# Patient Record
Sex: FEMALE | Race: WHITE | NOT HISPANIC OR LATINO | URBAN - METROPOLITAN AREA
[De-identification: names, ages, dates, MRNs, and addresses within clinical notes are randomized per-mention and may not be internally consistent; named-entity substitution may affect disease eponyms.]

---

## 2017-10-04 ENCOUNTER — OUTPATIENT (OUTPATIENT)
Dept: OUTPATIENT SERVICES | Facility: HOSPITAL | Age: 42
LOS: 1 days | End: 2017-10-04

## 2017-10-04 VITALS
RESPIRATION RATE: 16 BRPM | HEIGHT: 65 IN | TEMPERATURE: 99 F | SYSTOLIC BLOOD PRESSURE: 108 MMHG | WEIGHT: 190.92 LBS | HEART RATE: 68 BPM | DIASTOLIC BLOOD PRESSURE: 74 MMHG

## 2017-10-04 DIAGNOSIS — Z01.818 ENCOUNTER FOR OTHER PREPROCEDURAL EXAMINATION: ICD-10-CM

## 2017-10-04 DIAGNOSIS — O02.1 MISSED ABORTION: ICD-10-CM

## 2017-10-04 DIAGNOSIS — Z98.890 OTHER SPECIFIED POSTPROCEDURAL STATES: Chronic | ICD-10-CM

## 2017-10-04 LAB
BLD GP AB SCN SERPL QL: NEGATIVE — SIGNIFICANT CHANGE UP
HCT VFR BLD CALC: 39.9 % — SIGNIFICANT CHANGE UP (ref 34.5–45)
HGB BLD-MCNC: 13.3 G/DL — SIGNIFICANT CHANGE UP (ref 11.5–15.5)
MCHC RBC-ENTMCNC: 31.2 PG — SIGNIFICANT CHANGE UP (ref 27–34)
MCHC RBC-ENTMCNC: 33.3 % — SIGNIFICANT CHANGE UP (ref 32–36)
MCV RBC AUTO: 93.7 FL — SIGNIFICANT CHANGE UP (ref 80–100)
NRBC # FLD: 0 — SIGNIFICANT CHANGE UP
PLATELET # BLD AUTO: 198 K/UL — SIGNIFICANT CHANGE UP (ref 150–400)
PMV BLD: 11.5 FL — SIGNIFICANT CHANGE UP (ref 7–13)
RBC # BLD: 4.26 M/UL — SIGNIFICANT CHANGE UP (ref 3.8–5.2)
RBC # FLD: 13 % — SIGNIFICANT CHANGE UP (ref 10.3–14.5)
RH IG SCN BLD-IMP: POSITIVE — SIGNIFICANT CHANGE UP
WBC # BLD: 5.32 K/UL — SIGNIFICANT CHANGE UP (ref 3.8–10.5)
WBC # FLD AUTO: 5.32 K/UL — SIGNIFICANT CHANGE UP (ref 3.8–10.5)

## 2017-10-04 NOTE — H&P PST ADULT - GASTROINTESTINAL DETAILS
bowel sounds normal/no rebound tenderness/no rigidity/no organomegaly/no distention/no guarding/soft/no bruit/no masses palpable/nontender

## 2017-10-04 NOTE — H&P PST ADULT - HISTORY OF PRESENT ILLNESS
41 yr old  at approx 10 weeks gestation presents for preop evaluation with Missed .  Pt is now scheduled for Dilation Vacuum Curettage on 10/10/17.

## 2017-10-04 NOTE — H&P PST ADULT - RS GEN PE MLT RESP DETAILS PC
no wheezes/no rales/respirations non-labored/no rhonchi/breath sounds equal/clear to auscultation bilaterally

## 2017-10-04 NOTE — H&P PST ADULT - PRO PAIN LIFE ADAPT
decreased appetite/inability to work/inability to concentrate/inability to sleep/decreased activity level

## 2017-10-04 NOTE — H&P PST ADULT - LYMPHATIC
supraclavicular L/anterior cervical R/supraclavicular R/posterior cervical L/posterior cervical R/anterior cervical L

## 2017-10-04 NOTE — H&P PST ADULT - PROBLEM SELECTOR PLAN 1
Scheduled for Dilation Vacuum Curettage on 10/10/17.  Lab results pending.  Preop and famotidine instructions provided and questions addressed.

## 2017-10-09 ENCOUNTER — TRANSCRIPTION ENCOUNTER (OUTPATIENT)
Age: 42
End: 2017-10-09

## 2017-10-09 RX ORDER — SODIUM CHLORIDE 9 MG/ML
1000 INJECTION, SOLUTION INTRAVENOUS
Qty: 0 | Refills: 0 | Status: DISCONTINUED | OUTPATIENT
Start: 2017-10-10 | End: 2017-10-11

## 2017-10-10 ENCOUNTER — OUTPATIENT (OUTPATIENT)
Dept: OUTPATIENT SERVICES | Facility: HOSPITAL | Age: 42
LOS: 1 days | Discharge: HOME | End: 2017-10-10
Payer: MEDICAID

## 2017-10-10 ENCOUNTER — RESULT REVIEW (OUTPATIENT)
Age: 42
End: 2017-10-10

## 2017-10-10 VITALS
HEIGHT: 65 IN | WEIGHT: 190.92 LBS | DIASTOLIC BLOOD PRESSURE: 86 MMHG | SYSTOLIC BLOOD PRESSURE: 123 MMHG | TEMPERATURE: 98 F | OXYGEN SATURATION: 98 % | RESPIRATION RATE: 16 BRPM | HEART RATE: 74 BPM

## 2017-10-10 VITALS
TEMPERATURE: 98 F | DIASTOLIC BLOOD PRESSURE: 64 MMHG | OXYGEN SATURATION: 98 % | RESPIRATION RATE: 16 BRPM | HEART RATE: 75 BPM | SYSTOLIC BLOOD PRESSURE: 105 MMHG

## 2017-10-10 DIAGNOSIS — O02.1 MISSED ABORTION: ICD-10-CM

## 2017-10-10 DIAGNOSIS — Z98.890 OTHER SPECIFIED POSTPROCEDURAL STATES: Chronic | ICD-10-CM

## 2017-10-10 PROCEDURE — 88305 TISSUE EXAM BY PATHOLOGIST: CPT | Mod: 26

## 2017-10-10 RX ORDER — ACETAMINOPHEN 500 MG
2 TABLET ORAL
Qty: 0 | Refills: 0 | COMMUNITY

## 2017-10-10 RX ORDER — ASPIRIN/CALCIUM CARB/MAGNESIUM 324 MG
1 TABLET ORAL
Qty: 0 | Refills: 0 | COMMUNITY

## 2017-10-10 RX ORDER — TRANEXAMIC ACID 100 MG/ML
1 INJECTION, SOLUTION INTRAVENOUS
Qty: 6 | Refills: 0 | OUTPATIENT
Start: 2017-10-10 | End: 2017-10-13

## 2017-10-10 RX ADMIN — SODIUM CHLORIDE 30 MILLILITER(S): 9 INJECTION, SOLUTION INTRAVENOUS at 16:49

## 2017-10-10 NOTE — ASU DISCHARGE PLAN (ADULT/PEDIATRIC). - COMMENTS
Surgical Unit will call you on the next business day to follow up. [facility] [place] is open Monday - Friday.

## 2017-10-10 NOTE — ASU DISCHARGE PLAN (ADULT/PEDIATRIC). - NOTIFY
Bleeding that does not stop/Persistent Nausea and Vomiting/Unable to Urinate/GYN Fever>100.4/Numbness, tingling/Excessive Diarrhea GYN Fever>100.4/Numbness, tingling/Pain not relieved by Medications/Unable to Urinate/Inability to Tolerate Liquids or Foods/Bleeding that does not stop/Persistent Nausea and Vomiting/Excessive Diarrhea

## 2017-10-10 NOTE — ASU DISCHARGE PLAN (ADULT/PEDIATRIC). - ACTIVITY LEVEL
no douching/no tampons/no intercourse/no tub baths/nothing per vagina for 2 weeks/nothing per vagina nothing per vagina/no tub baths/no tampons/no intercourse/nothing per vagina for 2 weeks/no heavy lifting/no sports/gym/no douching/no exercise

## 2017-10-14 LAB — SURGICAL PATHOLOGY STUDY: SIGNIFICANT CHANGE UP

## 2018-04-11 NOTE — ASU PREOP CHECKLIST - 1.
After Visit Summary   4/11/2018    Mary Lerma    MRN: [id number]           Patient Information     Date Of Birth          1988        Visit Information        Provider Department      4/11/2018 8:30 AM Elena Mcdonough NP [facility]        Today's Diagnoses     Overweight    -  1    Fibromyalgia           Follow-ups after your visit        Additional Services     BARIATRIC ADULT REFERRAL       Your provider has referred you to: [facility]: [facility] [phone]  [email or web address]    Please be aware that coverage of these services is subject to the terms and limitations of your health insurance plan.  Call member services at your health plan with any benefit or coverage questions.      Please bring the following with you to your appointment:      (1) List of current medications   (2) This referral request   (3) Any documents/labs given to you for this referral                  Follow-up notes from your care team     Return in about 6 months (around 10/11/2018) for depression/anxiety recheck.      Who to contact     If you have questions or need follow up information about today's clinic visit or your schedule please contact [facility] directly at [phone].  Normal or non-critical lab and imaging results will be communicated to you by MyChart, letter or phone within 4 business days after the clinic has received the results. If you do not hear from us within 7 days, please contact the clinic through MyChart or phone. If you have a critical or abnormal lab result, we will notify you by phone as soon as possible.  Submit refill requests through [employer] or call your pharmacy and they will forward the refill request to us. Please allow 3 business days for your refill to be completed.          Additional Information About Your Visit       "  MyChart Information     [employer] gives you secure access to your electronic health record. If you see a primary care provider, you can also send messages to your care team and make appointments. If you have questions, please call your primary care clinic.  If you do not have a primary care provider, please call [phone] and they will assist you.        Care EveryWhere ID     This is your Care EveryWhere ID. This could be used by other organizations to access your [place] medical records  [id number]        Your Vitals Were     Pulse Temperature Respirations Height Pulse Oximetry Breastfeeding?    77 99.1  F (37.3  C) (Oral) 16 5' 2\" (1.575 m) 98% No    BMI (Body Mass Index)                   36.4 kg/m2            Blood Pressure from Last 3 Encounters:   04/11/18 100/62   02/19/18 118/71   02/06/18 112/71    Weight from Last 3 Encounters:   04/11/18 199 lb (90.3 kg)   02/19/18 188 lb (85.3 kg)   02/06/18 188 lb (85.3 kg)              We Performed the Following     BARIATRIC ADULT REFERRAL          Where to get your medicines      These medications were sent to [facility] [address] [place] [address] [id number] [place] [address] AT [address]  [address] [facility] [address]     Phone:  [phone]     DULoxetine 60 MG EC capsule          Primary Care Provider Office Phone # Fax #    Elena Mcdonough [phone]       [facility] [address] [address]  [facility] [address]        Equal Access to Services     [facility] : Hadii aad ku hadasho Soomaali, waaxda luqadaha, qaybta kaalmada ademya, héctor steward . So [facility] [phone].    ATENCIÓN: Si habla español, tiene a myers disposición servicios gratuitos de asistencia lingüística. Llame al [phone].    We comply with applicable federal civil rights laws and [place] laws. We do not discriminate on the basis of race, color, national origin, age, disability, sex, sexual orientation, or gender " identity.            Thank you!     Thank you for choosing [facility]  for your care. Our goal is always to provide you with excellent care. Hearing back from our patients is one way we can continue to improve our services. Please take a few minutes to complete the written survey that you may receive in the mail after your visit with us. Thank you!             Your Updated Medication List - Protect others around you: Learn how to safely use, store and throw away your medicines at www.disposemymeds.org.          This list is accurate as of 4/11/18  8:58 AM.  Always use your most recent med list.                   Brand Name Dispense Instructions for use Diagnosis    DULoxetine 60 MG EC capsule    CYMBALTA    90 capsule    Take 1 capsule (60 mg) by mouth daily    Fibromyalgia       medroxyPROGESTERone 150 MG/ML injection    DEPO-PROVERA    1 mL    Inject 1 mL (150 mg) into the muscle every 3 months    Encounter for initial prescription of injectable contraceptive       methocarbamol 500 MG tablet    ROBAXIN    75 tablet    Take 1-2 tablets (500-1,000 mg) by mouth 3 times daily as needed for muscle spasms    Muscle spasm       SUMAtriptan 100 MG tablet    IMITREX    18 tablet    Take 1 tablet (100 mg) by mouth at onset of headache for migraine May repeat in 2 hours. Max 2 tablets/24 hours.    Other migraine without status migrainosus, not intractable          Completed GYN questionnaire on chart

## 2020-09-24 NOTE — H&P PST ADULT - HEMATOLOGY/LYMPHATICS
Eduardo Hurtado  [address] [id number]  Phone: [phone]  Fax: [phone]  Follow Up Time:    negative

## 2021-07-06 PROBLEM — Z64.1 PROBLEMS RELATED TO MULTIPARITY: Chronic | Status: ACTIVE | Noted: 2017-10-04

## 2021-07-06 PROBLEM — O02.1 MISSED ABORTION: Chronic | Status: ACTIVE | Noted: 2017-10-04

## 2021-08-19 PROBLEM — Z00.00 ENCOUNTER FOR PREVENTIVE HEALTH EXAMINATION: Status: ACTIVE | Noted: 2021-08-19

## 2021-08-30 ENCOUNTER — TRANSCRIPTION ENCOUNTER (OUTPATIENT)
Age: 46
End: 2021-08-30

## 2021-08-30 ENCOUNTER — APPOINTMENT (OUTPATIENT)
Dept: OTOLARYNGOLOGY | Facility: CLINIC | Age: 46
End: 2021-08-30
Payer: MEDICAID

## 2021-08-30 VITALS — HEIGHT: 65 IN | TEMPERATURE: 97.4 F | WEIGHT: 171 LBS | BODY MASS INDEX: 28.49 KG/M2

## 2021-08-30 DIAGNOSIS — M26.609 UNSPECIFIED TEMPOROMANDIBULAR JOINT DISORDER: ICD-10-CM

## 2021-08-30 DIAGNOSIS — K21.9 GASTRO-ESOPHAGEAL REFLUX DISEASE W/OUT ESOPHAGITIS: ICD-10-CM

## 2021-08-30 DIAGNOSIS — J31.0 CHRONIC RHINITIS: ICD-10-CM

## 2021-08-30 DIAGNOSIS — R51.9 HEADACHE, UNSPECIFIED: ICD-10-CM

## 2021-08-30 PROCEDURE — 99204 OFFICE O/P NEW MOD 45 MIN: CPT | Mod: 25

## 2021-08-30 PROCEDURE — 31231 NASAL ENDOSCOPY DX: CPT

## 2021-08-30 RX ORDER — LEVONORGESTREL AND ETHINYL ESTRADIOL AND ETHINYL ESTRADIOL 150-30(84)
0.15-0.03 &0.01 KIT ORAL
Qty: 91 | Refills: 0 | Status: ACTIVE | COMMUNITY
Start: 2021-08-15

## 2021-08-30 NOTE — PHYSICAL EXAM
[Midline] : trachea located in midline position [Normal] : no rashes [Nasal Endoscopy Performed] : nasal endoscopy was performed, see procedure section for findings [] : septum deviated to the right [de-identified] : tenderness [Laryngoscopy Performed] : laryngoscopy was performed, see procedure section for findings [de-identified] : post pharyngeal erythema

## 2021-08-30 NOTE — END OF VISIT
[FreeTextEntry3] : I personally saw and examined CALEB OBANDO in detail. I spoke to JULIANNE Harman regarding the assessment and plan of care. I reviewed the above assessment and plan of care, and agree. I have made changes in changes in the body of the note where appropriate.I personally reviewed the HPI, PMH, FH, SH, ROS and medications/allergies. I have spoken to JULIANNE Harman regarding the history and have personally determined the assessment and plan of care, and documented this myself. I was present and participated in all key portions of the encounter and all procedures noted above. I have made changes in the body of the note where appropriate.\par \par Attesting Faculty: See Attending Signature Below \par \par \par  [Time Spent: ___ minutes] : I have spent [unfilled] minutes of time on the encounter.

## 2021-08-30 NOTE — PROCEDURE
[FreeTextEntry6] : Anterior Rhinoscopy insufficient to account for symptoms.\par \par After informed verbal consent is obtained, the fiberoptic nasal endoscope #23 is passed via the right nasal cavity.\par The following anatomic sites were directly examined in a sequential fashion:\par The scope was introduced in both  nasal passages between the middle and inferior turbinates to exam the inferior portion of the middle meatus and the fontanelle, as well as the maxillary ostia.  Next, the scope was passed medially and posteriorly to the middle turbinates to examine the sphenoethmoid recess and the superior turbinate region.\par  \par \par   There is [ 0 ]% obstruction of the nasopharynx with adenoid tissue.\par \par A deviated nasal septum was noted causing obstruction.\par The turbinates were congested-bilateral.\par \par Right Side:\par ·               Mucosa: wnl\par ·               Mucous: none\par ·               Polyp: none\par ·               Inferior Turbinate: sl congested\par ·               Middle Turbinate:sl congested\par ·               Superior Turbinate: wnl\par ·               Inferior Meatus:clear\par ·               Middle Meatus: clear\par ·               Super Meatus: clear\par ·               Sphenoethmoidal Recess: wnl\par \par \par \par Left Side:\par ·               Mucosa: wnl\par ·               Mucous:none\par ·               Polyp: none\par ·               Inferior Turbinate: sl congested\par ·               Middle Turbinate: sl congested\par ·               Superior Turbinate:wnl\par ·               Inferior Meatus: clear\par ·               Middle Meatus: clear\par ·               Super Meatus:clear\par ·               Sphenoethmoidal Recess: wnl\par \par  [de-identified] : Reason for the procedure-throat symptoms not adequately evaluated by mirror exam\par After informed verbal consent is obtained. \par The fiberoptic laryngoscope #  is passed via the  nasal cavity. There is no adenoid hypertrophy of the nasopharynx.  \par \par Tongue Base-wnl\par  Larynx-wnl\par Hypopharynx-wnl\par  tongue base, \par vallecular-wnl\par epiglottis-wnl\par subglottis-wnl\par posterior pharyngeal wall-wnl\par \par true vocal cords-small nodules bilat\par arytenoids-erythema and edema\par false vocal cords-wnl\par ventricles-wnl \par pyriform sinus-wnl no pooling\par aryepiglottic folds-wnl\par \par

## 2021-08-30 NOTE — REASON FOR VISIT
[Initial Evaluation] : an initial evaluation for [Ear Pain] : ear pain [Nasal Obstruction] : nasal obstruction [Hoarseness/Dysphonia] : hoarseness [Gastroesophageal Reflux] : gastroesophageal reflux

## 2021-08-30 NOTE — ASSESSMENT
[FreeTextEntry1] : Patient complains of sinus pressure and headache x many years and hoarseness \par \par Sinus headache and Pressure:\par -CT Sinus ordered \par -Possible Balloon sinuplasty vs septoplasty \par -Hypertonic saline advised \par \par Hoarseness 2/2 GERD and VC nodules\par -voice rest advised \par -follow up voice therapist \par \par TMJ:\par -soft food diet \par -dental evaluation \par -Advil for pain \par -warm and cold compresses\par -CBD oil advised \par \par f/u post CT scan or prn

## 2021-08-30 NOTE — HISTORY OF PRESENT ILLNESS
[de-identified] : 44 yo female\par Patient complains of sinus headache, nasal drip and hoarse x many years. She gets frontal and maxillary sinus pain and pressure. Takes Tylenol and Advil with mild relief. She was scheduled for sinus surgery years ago but never got it done. 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 She has been complaining of headache for many years with mild relief with Tylenol and Advil. She has tried nasal sprays in the past but it didn’t help her. Pt has no ear pain, ear drainage, hearing loss, tinnitus, vertigo, epistaxis, throat pain, dysphagia or fevers\par \par  [Otalgia] : otalgia [Eustachian Tube Dysfunction] : eustachian tube dysfunction [Cholesteatoma] : no cholesteatoma [Early Onset Hearing Loss] : no early onset hearing loss [Stroke] : no stroke [Nasal Congestion] : nasal congestion [Ear Fullness] : ear fullness [Allergic Rhinitis] : no allergic rhinitis [Septal Deviation] : septal deviation [Adenoidectomy] : no adenoidectomy [Allergies] : no allergies [Asthma] : no asthma [Hyperthyroidism] : no hyperthyroidism [Sialadenitis] : no sialadenitis [Non-Hodgkin Lymphoma] : no non-hodgkin lymphoma [Hodgkin Disease] : no hodgkin disease [None] : No risk factors have been identified. [Graves Disease] : no graves disease [Thyroid Cancer] : no thyroid cancer

## 2021-10-04 ENCOUNTER — OUTPATIENT (OUTPATIENT)
Dept: OUTPATIENT SERVICES | Facility: HOSPITAL | Age: 46
LOS: 1 days | End: 2021-10-04
Payer: MEDICAID

## 2021-10-04 ENCOUNTER — APPOINTMENT (OUTPATIENT)
Dept: CT IMAGING | Facility: IMAGING CENTER | Age: 46
End: 2021-10-04
Payer: MEDICAID

## 2021-10-04 DIAGNOSIS — J31.0 CHRONIC RHINITIS: ICD-10-CM

## 2021-10-04 DIAGNOSIS — J34.2 DEVIATED NASAL SEPTUM: ICD-10-CM

## 2021-10-04 DIAGNOSIS — Z98.890 OTHER SPECIFIED POSTPROCEDURAL STATES: Chronic | ICD-10-CM

## 2021-10-04 PROCEDURE — 70486 CT MAXILLOFACIAL W/O DYE: CPT | Mod: 26

## 2021-10-04 PROCEDURE — 70486 CT MAXILLOFACIAL W/O DYE: CPT

## 2021-11-01 ENCOUNTER — APPOINTMENT (OUTPATIENT)
Dept: OTOLARYNGOLOGY | Facility: CLINIC | Age: 46
End: 2021-11-01
Payer: MEDICAID

## 2021-11-01 VITALS
WEIGHT: 175 LBS | BODY MASS INDEX: 29.16 KG/M2 | HEIGHT: 65 IN | DIASTOLIC BLOOD PRESSURE: 81 MMHG | TEMPERATURE: 97 F | HEART RATE: 69 BPM | SYSTOLIC BLOOD PRESSURE: 113 MMHG

## 2021-11-01 DIAGNOSIS — J38.2 NODULES OF VOCAL CORDS: ICD-10-CM

## 2021-11-01 DIAGNOSIS — R49.0 DYSPHONIA: ICD-10-CM

## 2021-11-01 DIAGNOSIS — J34.2 DEVIATED NASAL SEPTUM: ICD-10-CM

## 2021-11-01 PROCEDURE — 99214 OFFICE O/P EST MOD 30 MIN: CPT | Mod: 25

## 2021-11-01 PROCEDURE — 95004 PERQ TESTS W/ALRGNC XTRCS: CPT

## 2021-11-01 PROCEDURE — 31231 NASAL ENDOSCOPY DX: CPT

## 2021-11-01 NOTE — REVIEW OF SYSTEMS
[Nasal Congestion] : nasal congestion [Sinus Pressure] : sinus pressure [Hoarseness] : hoarseness [Negative] : Heme/Lymph

## 2021-11-01 NOTE — END OF VISIT
[FreeTextEntry3] : I saw and examined this patient in person. I have discussed with Michelle Souza, Physician Assistant, in detail the above note and agree with the above assessment and plan of care.\par

## 2021-11-01 NOTE — ASSESSMENT
[FreeTextEntry1] : Patient long history of headaches been told has a deviated nasal septum had a CAT scan done which was reviewed with her shows significant deviated septum with a spur in the right nasal cavity her main complaints are difficulty breathing through her nose as well as recurrent pressure headaches that seem to be triggered into migraines.  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  We did allergy testing on her she was allergic to Mat which is a grass as well as dust mites and she was notified of that.  She has to check her insurance moving forward and wants to proceed in the near future.  Risks and benefits of the surgery were discussed at length.

## 2021-11-01 NOTE — HISTORY OF PRESENT ILLNESS
[de-identified] : Patient has had many years of headaches and nasal congestion with runny nose. SHe had a CT ordered by Dr Polo that shows clear sinuses but a DNS. SHe admits that she has never been able to breathe well through her nose and was due to have surgery to fix her septum but never did. SHe gets headaches that start in her nose and than radiate to her temples. SHe feels that the inside of her nose gets swollen.\par SHe has a raspy voice for many years intermittently and recently she has had worsening. 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 She has been told that she has nodules on the vocal cords. SHe does not have any issues with eating drinking or swallowing.

## 2021-12-07 ENCOUNTER — OUTPATIENT (OUTPATIENT)
Dept: OUTPATIENT SERVICES | Facility: HOSPITAL | Age: 46
LOS: 1 days | End: 2021-12-07

## 2021-12-07 VITALS
HEART RATE: 78 BPM | DIASTOLIC BLOOD PRESSURE: 71 MMHG | OXYGEN SATURATION: 98 % | HEIGHT: 64 IN | SYSTOLIC BLOOD PRESSURE: 109 MMHG | RESPIRATION RATE: 16 BRPM | TEMPERATURE: 97 F | WEIGHT: 175.05 LBS

## 2021-12-07 DIAGNOSIS — Z98.890 OTHER SPECIFIED POSTPROCEDURAL STATES: Chronic | ICD-10-CM

## 2021-12-07 DIAGNOSIS — J34.2 DEVIATED NASAL SEPTUM: ICD-10-CM

## 2021-12-07 LAB
ALBUMIN SERPL ELPH-MCNC: 4 G/DL — SIGNIFICANT CHANGE UP (ref 3.3–5)
ALP SERPL-CCNC: 42 U/L — SIGNIFICANT CHANGE UP (ref 40–120)
ALT FLD-CCNC: 25 U/L — SIGNIFICANT CHANGE UP (ref 4–33)
ANION GAP SERPL CALC-SCNC: 10 MMOL/L — SIGNIFICANT CHANGE UP (ref 7–14)
AST SERPL-CCNC: 19 U/L — SIGNIFICANT CHANGE UP (ref 4–32)
BILIRUB SERPL-MCNC: 0.3 MG/DL — SIGNIFICANT CHANGE UP (ref 0.2–1.2)
BUN SERPL-MCNC: 12 MG/DL — SIGNIFICANT CHANGE UP (ref 7–23)
CALCIUM SERPL-MCNC: 9.1 MG/DL — SIGNIFICANT CHANGE UP (ref 8.4–10.5)
CHLORIDE SERPL-SCNC: 107 MMOL/L — SIGNIFICANT CHANGE UP (ref 98–107)
CO2 SERPL-SCNC: 25 MMOL/L — SIGNIFICANT CHANGE UP (ref 22–31)
CREAT SERPL-MCNC: 0.63 MG/DL — SIGNIFICANT CHANGE UP (ref 0.5–1.3)
GLUCOSE SERPL-MCNC: 81 MG/DL — SIGNIFICANT CHANGE UP (ref 70–99)
HCG UR QL: NEGATIVE — SIGNIFICANT CHANGE UP
HCT VFR BLD CALC: 40.6 % — SIGNIFICANT CHANGE UP (ref 34.5–45)
HGB BLD-MCNC: 13.9 G/DL — SIGNIFICANT CHANGE UP (ref 11.5–15.5)
MCHC RBC-ENTMCNC: 32.3 PG — SIGNIFICANT CHANGE UP (ref 27–34)
MCHC RBC-ENTMCNC: 34.2 GM/DL — SIGNIFICANT CHANGE UP (ref 32–36)
MCV RBC AUTO: 94.4 FL — SIGNIFICANT CHANGE UP (ref 80–100)
NRBC # BLD: 0 /100 WBCS — SIGNIFICANT CHANGE UP
NRBC # FLD: 0 K/UL — SIGNIFICANT CHANGE UP
PLATELET # BLD AUTO: 216 K/UL — SIGNIFICANT CHANGE UP (ref 150–400)
POTASSIUM SERPL-MCNC: 3.7 MMOL/L — SIGNIFICANT CHANGE UP (ref 3.5–5.3)
POTASSIUM SERPL-SCNC: 3.7 MMOL/L — SIGNIFICANT CHANGE UP (ref 3.5–5.3)
PROT SERPL-MCNC: 7 G/DL — SIGNIFICANT CHANGE UP (ref 6–8.3)
RBC # BLD: 4.3 M/UL — SIGNIFICANT CHANGE UP (ref 3.8–5.2)
RBC # FLD: 13.3 % — SIGNIFICANT CHANGE UP (ref 10.3–14.5)
SODIUM SERPL-SCNC: 142 MMOL/L — SIGNIFICANT CHANGE UP (ref 135–145)
WBC # BLD: 5.02 K/UL — SIGNIFICANT CHANGE UP (ref 3.8–10.5)
WBC # FLD AUTO: 5.02 K/UL — SIGNIFICANT CHANGE UP (ref 3.8–10.5)

## 2021-12-07 RX ORDER — SODIUM CHLORIDE 9 MG/ML
1000 INJECTION, SOLUTION INTRAVENOUS
Refills: 0 | Status: DISCONTINUED | OUTPATIENT
Start: 2021-12-22 | End: 2022-01-05

## 2021-12-07 RX ORDER — SODIUM CHLORIDE 9 MG/ML
3 INJECTION INTRAMUSCULAR; INTRAVENOUS; SUBCUTANEOUS ONCE
Refills: 0 | Status: DISCONTINUED | OUTPATIENT
Start: 2021-12-22 | End: 2022-01-05

## 2021-12-07 NOTE — H&P PST ADULT - NEGATIVE ENMT SYMPTOMS
no hearing difficulty/no ear pain/no tinnitus/no vertigo/no post-nasal discharge/no nose bleeds/no throat pain

## 2021-12-07 NOTE — H&P PST ADULT - NSICDXFAMILYHX_GEN_ALL_CORE_FT
FAMILY HISTORY:  Father  Still living? Yes, Estimated age: Age Unknown  Family history of heart disease, Age at diagnosis: Age Unknown

## 2021-12-07 NOTE — H&P PST ADULT - NEGATIVE GASTROINTESTINAL SYMPTOMS
no nausea/no vomiting/no diarrhea/no constipation/no abdominal pain no nausea/no vomiting/no diarrhea/no constipation/no change in bowel habits/no abdominal pain

## 2021-12-07 NOTE — H&P PST ADULT - NEGATIVE NEUROLOGICAL SYMPTOMS
no transient paralysis/no weakness/no paresthesias/no generalized seizures/no syncope/no tremors/no vertigo/no loss of sensation/no difficulty walking/no confusion

## 2021-12-07 NOTE — H&P PST ADULT - PROBLEM SELECTOR PLAN 1
preop for Septoplasty, bilateral Turbinoplasty on 12/22/21  preop instructions given, pt verbalized understanding  GI prophylaxis provided  pt informed COVID testing must be done 72 hours prior to surgery

## 2021-12-07 NOTE — H&P PST ADULT - NSICDXPASTMEDICALHX_GEN_ALL_CORE_FT
PAST MEDICAL HISTORY:  Deviated nasal septum     H/O headache     History of hepatitis A age 15 yrs old    Hoarseness     Missed      Multigravida     Vocal cord nodule seen by ENT

## 2021-12-19 ENCOUNTER — APPOINTMENT (OUTPATIENT)
Dept: DISASTER EMERGENCY | Facility: CLINIC | Age: 46
End: 2021-12-19

## 2021-12-19 DIAGNOSIS — Z01.818 ENCOUNTER FOR OTHER PREPROCEDURAL EXAMINATION: ICD-10-CM

## 2021-12-19 PROBLEM — J34.2 DEVIATED NASAL SEPTUM: Chronic | Status: ACTIVE | Noted: 2021-12-07

## 2021-12-19 PROBLEM — J38.2 NODULES OF VOCAL CORDS: Chronic | Status: ACTIVE | Noted: 2021-12-07

## 2021-12-19 PROBLEM — Z86.19 PERSONAL HISTORY OF OTHER INFECTIOUS AND PARASITIC DISEASES: Chronic | Status: ACTIVE | Noted: 2021-12-07

## 2021-12-19 PROBLEM — R49.0 DYSPHONIA: Chronic | Status: ACTIVE | Noted: 2021-12-07

## 2021-12-19 PROBLEM — Z87.898 PERSONAL HISTORY OF OTHER SPECIFIED CONDITIONS: Chronic | Status: ACTIVE | Noted: 2021-12-07

## 2021-12-20 LAB — SARS-COV-2 N GENE NPH QL NAA+PROBE: NOT DETECTED

## 2021-12-21 ENCOUNTER — TRANSCRIPTION ENCOUNTER (OUTPATIENT)
Age: 46
End: 2021-12-21

## 2021-12-21 VITALS
DIASTOLIC BLOOD PRESSURE: 72 MMHG | OXYGEN SATURATION: 99 % | WEIGHT: 175.05 LBS | TEMPERATURE: 99 F | RESPIRATION RATE: 18 BRPM | HEIGHT: 64 IN | HEART RATE: 75 BPM | SYSTOLIC BLOOD PRESSURE: 108 MMHG

## 2021-12-21 NOTE — ASU PREOPERATIVE ASSESSMENT, ADULT ([facility] ONLY) - FALL HARM RISK - UNIVERSAL INTERVENTIONS
Bed in lowest position, wheels locked, appropriate side rails in place/Call bell, personal items and telephone in reach/Instruct patient to call for assistance before getting out of bed or chair/Non-slip footwear when patient is out of bed/[place] to call system/Physically safe environment - no spills, clutter or unnecessary equipment/Purposeful Proactive Rounding/Room/bathroom lighting operational, light cord in reach

## 2021-12-22 ENCOUNTER — OUTPATIENT (OUTPATIENT)
Dept: OUTPATIENT SERVICES | Facility: HOSPITAL | Age: 46
LOS: 1 days | Discharge: HOME | End: 2021-12-22
Payer: MEDICAID

## 2021-12-22 ENCOUNTER — RESULT REVIEW (OUTPATIENT)
Age: 46
End: 2021-12-22

## 2021-12-22 ENCOUNTER — APPOINTMENT (OUTPATIENT)
Dept: OTOLARYNGOLOGY | Facility: AMBULATORY SURGERY CENTER | Age: 46
End: 2021-12-22

## 2021-12-22 VITALS
DIASTOLIC BLOOD PRESSURE: 59 MMHG | HEART RATE: 81 BPM | OXYGEN SATURATION: 97 % | RESPIRATION RATE: 15 BRPM | SYSTOLIC BLOOD PRESSURE: 90 MMHG | TEMPERATURE: 97 F

## 2021-12-22 DIAGNOSIS — J34.2 DEVIATED NASAL SEPTUM: ICD-10-CM

## 2021-12-22 DIAGNOSIS — Z98.890 OTHER SPECIFIED POSTPROCEDURAL STATES: Chronic | ICD-10-CM

## 2021-12-22 PROCEDURE — 30140 RESECT INFERIOR TURBINATE: CPT | Mod: 50

## 2021-12-22 PROCEDURE — 30520 REPAIR OF NASAL SEPTUM: CPT

## 2021-12-22 PROCEDURE — 88311 DECALCIFY TISSUE: CPT | Mod: 26

## 2021-12-22 PROCEDURE — 88304 TISSUE EXAM BY PATHOLOGIST: CPT | Mod: 26

## 2021-12-22 RX ORDER — ACETAMINOPHEN AND CODEINE 300; 30 MG/1; MG/1
300-30 TABLET ORAL
Qty: 8 | Refills: 0 | Status: ACTIVE | COMMUNITY
Start: 2021-12-22 | End: 1900-01-01

## 2021-12-22 RX ORDER — AZITHROMYCIN 500 MG/1
1 TABLET, FILM COATED ORAL
Qty: 6 | Refills: 0
Start: 2021-12-22 | End: 2021-12-26

## 2021-12-22 RX ORDER — ACETAMINOPHEN WITH CODEINE 300MG-30MG
1 TABLET ORAL
Qty: 8 | Refills: 0
Start: 2021-12-22 | End: 2021-12-23

## 2021-12-22 RX ORDER — AZITHROMYCIN 250 MG/1
250 TABLET, FILM COATED ORAL
Qty: 1 | Refills: 0 | Status: ACTIVE | COMMUNITY
Start: 2021-12-22 | End: 1900-01-01

## 2021-12-22 NOTE — ASU DISCHARGE PLAN (ADULT/PEDIATRIC) - CARE PROVIDER_API CALL
Nicko Vaca (MD)  Facial Plastic and Reconstructive Surgery; Otolaryngology  [address] [place] [address], [facility] [address]  [place] [address]  Phone: [phone]  Fax: [phone]  Follow Up Time:

## 2021-12-23 ENCOUNTER — APPOINTMENT (OUTPATIENT)
Dept: OTOLARYNGOLOGY | Facility: CLINIC | Age: 46
End: 2021-12-23
Payer: MEDICAID

## 2021-12-23 VITALS
SYSTOLIC BLOOD PRESSURE: 127 MMHG | DIASTOLIC BLOOD PRESSURE: 88 MMHG | HEIGHT: 65 IN | WEIGHT: 175 LBS | TEMPERATURE: 97.6 F | BODY MASS INDEX: 29.16 KG/M2 | HEART RATE: 71 BPM

## 2021-12-23 PROCEDURE — 99024 POSTOP FOLLOW-UP VISIT: CPT

## 2021-12-23 PROCEDURE — 31237 NSL/SINS NDSC SURG BX POLYPC: CPT | Mod: 58

## 2021-12-23 NOTE — ASSESSMENT
[FreeTextEntry1] : Patient follows up 1 day status post septoplasty with turbs. Patient started taking the antibiotics as prescribed and is taking pain medication as needed. nasal packing was removed from the bilateral nasal cavities without issue. Patient was debrided bilaterally with rigid suction as a result of nasal crusting. Tip dressing was replaced and should be changed as needed until followup. Patient should continue to avoid nose blowing, heavy lifting or exercising, and should start a regimen of over the counter nasal saline spray for moisturization of the nasal cavities. Patient was advised to continue the antibiotics and to followup on postoperative day 5 for nasal splint removal.

## 2021-12-27 ENCOUNTER — APPOINTMENT (OUTPATIENT)
Dept: OTOLARYNGOLOGY | Facility: CLINIC | Age: 46
End: 2021-12-27
Payer: MEDICAID

## 2021-12-27 VITALS
TEMPERATURE: 97.3 F | SYSTOLIC BLOOD PRESSURE: 122 MMHG | WEIGHT: 175 LBS | HEIGHT: 65 IN | DIASTOLIC BLOOD PRESSURE: 81 MMHG | HEART RATE: 70 BPM | BODY MASS INDEX: 29.16 KG/M2

## 2021-12-27 PROCEDURE — 99024 POSTOP FOLLOW-UP VISIT: CPT

## 2021-12-27 PROCEDURE — 31237 NSL/SINS NDSC SURG BX POLYPC: CPT | Mod: 50,58

## 2021-12-29 ENCOUNTER — NON-APPOINTMENT (OUTPATIENT)
Age: 46
End: 2021-12-29

## 2021-12-29 LAB — SURGICAL PATHOLOGY STUDY: SIGNIFICANT CHANGE UP

## 2021-12-30 NOTE — HISTORY OF PRESENT ILLNESS
[de-identified] : Patient is 5 days s/p septoplasty and is overall doing well with minor nasal congestion bilaterally. She finished her antibiotics and is not having any issues with fevers or chills. She used the nasal saline over the weekend. SHe does not have any issues with facial pressure or pain. She did not have any bleeding over the weekend

## 2021-12-30 NOTE — ASSESSMENT
[FreeTextEntry1] : Patient follows up 5 days status post septoplasty with turbs. Patient has finished taking the antibiotics as prescribed and is taking pain medication as needed. nasal splints were removed from the bilateral nasal cavities without issue. Patient was debrided bilaterally with rigid suction as a result of nasal crusting. Tip dressing was replaced and should be changed as needed until followup. Patient should continue to avoid nose blowing, heavy lifting or exercising, and should continue a regimen of over the counter nasal saline spray for moisturization of the nasal cavities. Patient will followup in 1 week for further debridement.

## 2022-01-10 ENCOUNTER — APPOINTMENT (OUTPATIENT)
Dept: OTOLARYNGOLOGY | Facility: CLINIC | Age: 47
End: 2022-01-10
Payer: MEDICAID

## 2022-01-10 VITALS
HEART RATE: 74 BPM | BODY MASS INDEX: 29.16 KG/M2 | HEIGHT: 65 IN | SYSTOLIC BLOOD PRESSURE: 109 MMHG | DIASTOLIC BLOOD PRESSURE: 71 MMHG | WEIGHT: 175 LBS | TEMPERATURE: 97.6 F

## 2022-01-10 PROCEDURE — 31237 NSL/SINS NDSC SURG BX POLYPC: CPT | Mod: 50,58

## 2022-01-10 PROCEDURE — 99024 POSTOP FOLLOW-UP VISIT: CPT

## 2022-01-10 NOTE — ASSESSMENT
[FreeTextEntry1] : Patient 3 weeks status post septoplasty breathing tremendously better already had some crusting which was endoscopically debrided she still feels some pressure she understands that it is early sure we will reevaluate her in a month but I feel that she is healed very nicely and will no further hopefully no further intervention will be indicated.

## 2022-01-10 NOTE — HISTORY OF PRESENT ILLNESS
[de-identified] : PAtient is about 3 weeks s/p septoplasty and is doing well overall. She does not have any pain in the cheeks or the nose. SHe has been doing her saline rinses. She has been feeling moderate crusting build up. SHe does not have any issues with bleeding from the nose. SHe does admit that she is breathing better through both nasal cavities

## 2022-02-14 ENCOUNTER — APPOINTMENT (OUTPATIENT)
Dept: OTOLARYNGOLOGY | Facility: CLINIC | Age: 47
End: 2022-02-14
Payer: MEDICAID

## 2022-02-14 VITALS
HEART RATE: 69 BPM | WEIGHT: 175 LBS | TEMPERATURE: 98 F | HEIGHT: 65 IN | DIASTOLIC BLOOD PRESSURE: 76 MMHG | SYSTOLIC BLOOD PRESSURE: 106 MMHG | BODY MASS INDEX: 29.16 KG/M2

## 2022-02-14 DIAGNOSIS — J34.89 OTHER SPECIFIED DISORDERS OF NOSE AND NASAL SINUSES: ICD-10-CM

## 2022-02-14 PROCEDURE — 31237 NSL/SINS NDSC SURG BX POLYPC: CPT | Mod: 50,58

## 2022-02-14 PROCEDURE — 99024 POSTOP FOLLOW-UP VISIT: CPT

## 2022-02-14 NOTE — ASSESSMENT
[FreeTextEntry1] : Patient status post septoplasty breathing tremendously better migraines seem to have improved his only been 2months we worked on the assumption of a contact pressure-point that could have been minimized will wait and see he she can start nasal sprays again in 2 weeks should follow-up and see us in 3 months.

## 2022-02-14 NOTE — HISTORY OF PRESENT ILLNESS
[de-identified] : Patient is overall doing well and not having any breathing issues through the nose, but she does admit to some mild pressure over the bridge of the nose on occasion that contributes to headaches. SHe does not have any nosebleeds or sinus pressure. She is not using any nasal sprays right now.

## 2022-03-21 NOTE — DATA REVIEWED
[No studies available for review at this time] : No studies available for review at this time Additional Progress Note...

## 2022-05-16 ENCOUNTER — APPOINTMENT (OUTPATIENT)
Dept: OTOLARYNGOLOGY | Facility: CLINIC | Age: 47
End: 2022-05-16
Payer: MEDICAID

## 2022-05-16 VITALS — HEIGHT: 65 IN | TEMPERATURE: 97.1 F | BODY MASS INDEX: 29.16 KG/M2 | WEIGHT: 175 LBS

## 2022-05-16 DIAGNOSIS — J34.89 OTHER SPECIFIED DISORDERS OF NOSE AND NASAL SINUSES: ICD-10-CM

## 2022-05-16 DIAGNOSIS — R51.9 HEADACHE, UNSPECIFIED: ICD-10-CM

## 2022-05-16 DIAGNOSIS — Z98.890 OTHER SPECIFIED POSTPROCEDURAL STATES: ICD-10-CM

## 2022-05-16 PROCEDURE — 99214 OFFICE O/P EST MOD 30 MIN: CPT | Mod: 25

## 2022-05-16 PROCEDURE — 31231 NASAL ENDOSCOPY DX: CPT

## 2022-05-16 RX ORDER — FLUTICASONE PROPIONATE 50 UG/1
50 SPRAY, METERED NASAL DAILY
Qty: 1 | Refills: 3 | Status: ACTIVE | COMMUNITY
Start: 2022-05-16 | End: 1900-01-01

## 2022-05-16 NOTE — ASSESSMENT
[FreeTextEntry1] : Patient is status post septoplasty turbinoplasty for a contact pressure-point part of her septum she is breathing better she is not sure if her headaches are much different although she does describe that her headaches went from nasal throbbing to the nasal pressure.  Since is less than a year that can continue to improve endoscopically she is healed nicely no further acute interventions indicated she will follow-up and see us in 3 months she does claim that she started having some rhinitis so we put her on azelastine and see if that helps her during allergy season and will reassess her in 3 months.

## 2022-05-16 NOTE — REASON FOR VISIT
[Subsequent Evaluation] : a subsequent evaluation for [FreeTextEntry2] : 5 months s/p septoplasty and turbinoplasty

## 2022-05-16 NOTE — HISTORY OF PRESENT ILLNESS
[de-identified] : patient contributes to have a runny nose bilaterally as well as some headaches. SHe cannot tell if her head pressure and pain is still a part of her nasal pressure that she feels. She does admit to slight improvement frequency of the headaches. She does not have any recent nosebleeds. She describes the pain in the nose as a pressure that comes and goes

## 2023-08-24 ENCOUNTER — APPOINTMENT (OUTPATIENT)
Dept: OTOLARYNGOLOGY | Facility: CLINIC | Age: 48
End: 2023-08-24
Payer: MEDICAID

## 2023-08-24 VITALS
HEART RATE: 73 BPM | BODY MASS INDEX: 28.32 KG/M2 | HEIGHT: 65 IN | SYSTOLIC BLOOD PRESSURE: 108 MMHG | WEIGHT: 170 LBS | OXYGEN SATURATION: 99 % | DIASTOLIC BLOOD PRESSURE: 74 MMHG

## 2023-08-24 PROCEDURE — 99214 OFFICE O/P EST MOD 30 MIN: CPT | Mod: 25

## 2023-08-24 PROCEDURE — 99213 OFFICE O/P EST LOW 20 MIN: CPT | Mod: 25

## 2023-08-24 PROCEDURE — 31231 NASAL ENDOSCOPY DX: CPT

## 2023-08-24 PROCEDURE — 92557 COMPREHENSIVE HEARING TEST: CPT

## 2023-08-24 PROCEDURE — 92567 TYMPANOMETRY: CPT

## 2023-08-24 NOTE — PHYSICAL EXAM
[Nasal Endoscopy Performed] : nasal endoscopy was performed, see procedure section for findings [Normal] : no abnormal secretions [] : septum deviated to the right [de-identified] : R likely bubble with effusion/ L possible effusion

## 2023-08-24 NOTE — ASSESSMENT
[FreeTextEntry1] : Ms. OBANDO is a 47 year female with ear clogging after URI. Ear exam is remarkable for R TM effusion, otherwise unremarkable  - Audio with tymps today shows normal hearing, still with type As tympanogram on the left - c/w Flonase daily for at least 3 more weeks possibly 6 weeks – Follow-up as needed

## 2023-08-24 NOTE — DATA REVIEWED
[de-identified] : 08/24/2023: Hearing within normal limits, type As tympanogram on the left, type a tympanogram on the right

## 2023-08-24 NOTE — HISTORY OF PRESENT ILLNESS
[de-identified] : s/p septoplasty / turbs with Dr. Vegas 12/2021 [FreeTextEntry1] : 3 weeks ago pt had URI sore throat, she saw PCP strep negative, tx with Augmentin and Ofloxin gtts pain resolved, 2 weeks ago L ear became clogged now hearing slightly better, she also has crackling noise in R ear but not fully clogged she has been on Flonase daily for 3 weeks

## 2023-08-24 NOTE — REASON FOR VISIT
[Subsequent Evaluation] : a subsequent evaluation for [Hearing Loss] : hearing loss [Throat Pain] : throat pain

## 2023-08-24 NOTE — PROCEDURE
[FreeTextEntry6] : reason for exam: anterior rhinoscopy insufficient for symptom evaluation   Fiberoptic nasopharyngoscopy was performed.  R/b/a of procedure was explained to the patient and they agreed to proceed with procedure.  left inferior turbinate was normal, left middle turbinate was  normal, left superior turbinate was normal no boggy or erythematous mucosa.  OMC patent on left, superior, inferior and middle meati clear no evidence of purulence or sinusitis.  left sphenoethmoidal recess clear.  No nasal polyps seen.  Septum midline. NP clear no masses or lesions.  No obstruction of choanae, torus clear.  scope #:212

## 2023-08-24 NOTE — END OF VISIT
[FreeTextEntry3] : I personally saw and examined CALEB OBANDO in detail.  I spoke to JULIAN Foster regarding the assessment and plan of care. I performed the procedures and relevant physical exam.  I have reviewed the above assessment and plan of care and I agree.  I have made changes to the body of the note wherever necessary and appropriate.

## 2023-09-12 ENCOUNTER — APPOINTMENT (OUTPATIENT)
Dept: OTOLARYNGOLOGY | Facility: CLINIC | Age: 48
End: 2023-09-12

## 2024-08-08 NOTE — H&P PST ADULT - NSANTHAGERD_ENT_A_CORE
No
Treatment Goal Met?: Yes
Treatment Goal Explanation (Does Not Render In The Note): Stable for the purposes of categorizing medical decision making is defined by the specific treatment goals for an individual patient. A patient that is not at their treatment goal is not stable, even if the condition has not changed and there is no short- term threat to life or function.